# Patient Record
Sex: FEMALE | Race: WHITE | NOT HISPANIC OR LATINO | Employment: PART TIME | URBAN - METROPOLITAN AREA
[De-identification: names, ages, dates, MRNs, and addresses within clinical notes are randomized per-mention and may not be internally consistent; named-entity substitution may affect disease eponyms.]

---

## 2023-12-26 PROBLEM — L70.9 ACNE: Status: ACTIVE | Noted: 2023-12-26

## 2023-12-26 PROBLEM — J30.2 SEASONAL ALLERGIES: Status: ACTIVE | Noted: 2023-12-26

## 2023-12-26 NOTE — PROGRESS NOTES
Subjective   Patient ID: Elif Morgan is a 23 y.o. female who presents for Establish Care, Annual Exam, and Mass.    HPI    Patient presents today to establish care.  Last PCP was: Kristi Hook in [facility]  Last saw PCP:11/8/22  Chose to come here because she was a previous patient    Patient presents today for annual exam.  Denies SOB or chest pain.  Eats healthy diet.  Exercises 2-3x per week.  Sleeping well..  Denies abdominal pain, black or bloody stools.  BM/Urination normal.  Last eye exam was 1 year ago  Last dental exam was 10/2023  No new family h/o cancers or heart disease   Is not fasting for BW.    Pt has a lump on right side of her neck  Pt notice it 2 weeks ago    A friend recently has had mono also and she has been more tired  No abdominal pains no change in her bowels    No fevers or chills at night no other concerning symptoms of backaches    Did have sinus polyps in the past had surgery on those    No known family has had any type of cancers leukemias    Her white count was found to be elevated last year but it was during an illness,, para her was never rechecked    Pt is having cold symptom  Ongoing since 12/24/23  Other symptoms include Nasal congestion, headaches, sinus pressures   Coughing, fatigue  No fever or chill   Pt states not testing for Covid  Pt is taking Cough & cold medicine , Day Quill with relief    No other concern    Review of systems  ; Patient seen today for exam denies any problems with headaches or vision, denies any shortness of breath chest pain nausea or vomiting, no black stool no blood in the stool no heartburn type symptoms denies any problems with constipation or diarrhea, and no dysuria-type symptoms    The patient's allergies medications were reviewed with them today    The patient's social family and surgical history or also reviewed here today, along with her past medical history.     Objective     Alert and active in  no acute distress  HEENT TMs clear oropharynx  "negative nares clear no drainage noted neck supple  With positive right mandibular lymph node tenderness small area of posterior lymph node on the right also tender small cyst left suboccipital muscles no other adenopathy anteriorly or posteriorly or in her groin   Heart regular rate and rhythm without murmur and no carotid bruits  Lungs- clear to auscultation bilaterally, no wheeze or rhonchi noted  Thyroid -negative masses or nodularity  Abdomen- soft times four quadrants , bowel sounds positive no masses or organomegaly, negative tenderness guarding or rebound  Neurological exam unremarkable- DTRs in upper and lower extremities within normal limits.   skin -no lesions noted      /80 (BP Location: Left arm, Patient Position: Sitting, BP Cuff Size: Adult)   Pulse 85   Temp 36.4 °C (97.6 °F) (Temporal)   Resp 16   Ht 1.575 m (5' 2\")   Wt 68.3 kg (150 lb 9.6 oz)   SpO2 100%   BMI 27.55 kg/m²     No Known Allergies    Assessment/Plan   Problem List Items Addressed This Visit       Routine general medical examination at a health care facility - Primary    Relevant Orders    Lipid Panel    CBC and Auto Differential    Comprehensive Metabolic Panel    BMI 27.0-27.9,adult     Other Visit Diagnoses       Acute recurrent frontal sinusitis        Relevant Medications    cefdinir (Omnicef) 300 mg capsule    Malaise and fatigue        Relevant Orders    Tsh With Reflex To Free T4 If Abnormal    Mariaa-Barr virus VCA antibody panel    Lymphadenopathy            She will go ahead and start some antibiotics for her sinuses and this chronic polyposis she has had,, I do not want to get blood work for at least 2 weeks until she is well    Then we will recheck her blood counts to try get this records from last year also will check a mono screen to see if she has been exposed    Understands importance of doing so we did discuss low likelihood of any type of leukemia but things need to be checked  She is agreeable if her " parents have any questions she can be permission to speak with      If anything worsens or changes please call us at once, follow up in the office as planned,

## 2023-12-28 ENCOUNTER — OFFICE VISIT (OUTPATIENT)
Dept: PRIMARY CARE | Facility: CLINIC | Age: 23
End: 2023-12-28
Payer: COMMERCIAL

## 2023-12-28 VITALS
HEART RATE: 85 BPM | SYSTOLIC BLOOD PRESSURE: 104 MMHG | DIASTOLIC BLOOD PRESSURE: 80 MMHG | WEIGHT: 150.6 LBS | HEIGHT: 62 IN | BODY MASS INDEX: 27.71 KG/M2 | OXYGEN SATURATION: 100 % | RESPIRATION RATE: 16 BRPM | TEMPERATURE: 97.6 F

## 2023-12-28 DIAGNOSIS — J01.11 ACUTE RECURRENT FRONTAL SINUSITIS: ICD-10-CM

## 2023-12-28 DIAGNOSIS — R59.1 LYMPHADENOPATHY: ICD-10-CM

## 2023-12-28 DIAGNOSIS — Z00.00 ROUTINE GENERAL MEDICAL EXAMINATION AT A HEALTH CARE FACILITY: Primary | ICD-10-CM

## 2023-12-28 DIAGNOSIS — R53.81 MALAISE AND FATIGUE: ICD-10-CM

## 2023-12-28 DIAGNOSIS — R53.83 MALAISE AND FATIGUE: ICD-10-CM

## 2023-12-28 PROCEDURE — 99385 PREV VISIT NEW AGE 18-39: CPT | Performed by: FAMILY MEDICINE

## 2023-12-28 PROCEDURE — 1036F TOBACCO NON-USER: CPT | Performed by: FAMILY MEDICINE

## 2023-12-28 PROCEDURE — 3008F BODY MASS INDEX DOCD: CPT | Performed by: FAMILY MEDICINE

## 2023-12-28 RX ORDER — CEFDINIR 300 MG/1
600 CAPSULE ORAL DAILY
Qty: 20 CAPSULE | Refills: 0 | Status: SHIPPED | OUTPATIENT
Start: 2023-12-28 | End: 2024-01-07

## 2023-12-28 ASSESSMENT — PATIENT HEALTH QUESTIONNAIRE - PHQ9
1. LITTLE INTEREST OR PLEASURE IN DOING THINGS: NOT AT ALL
SUM OF ALL RESPONSES TO PHQ9 QUESTIONS 1 AND 2: 0
2. FEELING DOWN, DEPRESSED OR HOPELESS: NOT AT ALL

## 2024-01-12 ENCOUNTER — LAB (OUTPATIENT)
Dept: LAB | Facility: LAB | Age: 24
End: 2024-01-12
Payer: COMMERCIAL

## 2024-01-12 DIAGNOSIS — R53.81 MALAISE AND FATIGUE: ICD-10-CM

## 2024-01-12 DIAGNOSIS — R53.83 MALAISE AND FATIGUE: ICD-10-CM

## 2024-01-12 DIAGNOSIS — Z00.00 ROUTINE GENERAL MEDICAL EXAMINATION AT A HEALTH CARE FACILITY: ICD-10-CM

## 2024-01-12 LAB
ALBUMIN SERPL BCP-MCNC: 4.2 G/DL (ref 3.4–5)
ALP SERPL-CCNC: 63 U/L (ref 33–110)
ALT SERPL W P-5'-P-CCNC: 12 U/L (ref 7–45)
ANION GAP SERPL CALC-SCNC: 11 MMOL/L (ref 10–20)
AST SERPL W P-5'-P-CCNC: 16 U/L (ref 9–39)
BASOPHILS # BLD AUTO: 0.03 X10*3/UL (ref 0–0.1)
BASOPHILS NFR BLD AUTO: 0.4 %
BILIRUB SERPL-MCNC: 0.5 MG/DL (ref 0–1.2)
BUN SERPL-MCNC: 12 MG/DL (ref 6–23)
CALCIUM SERPL-MCNC: 9.5 MG/DL (ref 8.6–10.3)
CHLORIDE SERPL-SCNC: 104 MMOL/L (ref 98–107)
CHOLEST SERPL-MCNC: 164 MG/DL (ref 0–199)
CHOLESTEROL/HDL RATIO: 2.1
CO2 SERPL-SCNC: 28 MMOL/L (ref 21–32)
CREAT SERPL-MCNC: 0.78 MG/DL (ref 0.5–1.05)
EBV EA IGG SER QL: NEGATIVE
EBV NA AB SER QL: NEGATIVE
EBV VCA IGG SER IA-ACNC: NEGATIVE
EBV VCA IGM SER IA-ACNC: NORMAL
EGFRCR SERPLBLD CKD-EPI 2021: >90 ML/MIN/1.73M*2
EOSINOPHIL # BLD AUTO: 0.22 X10*3/UL (ref 0–0.7)
EOSINOPHIL NFR BLD AUTO: 2.6 %
ERYTHROCYTE [DISTWIDTH] IN BLOOD BY AUTOMATED COUNT: 12.2 % (ref 11.5–14.5)
GLUCOSE SERPL-MCNC: 92 MG/DL (ref 74–99)
HCT VFR BLD AUTO: 41 % (ref 36–46)
HDLC SERPL-MCNC: 77.9 MG/DL
HGB BLD-MCNC: 13.4 G/DL (ref 12–16)
IMM GRANULOCYTES # BLD AUTO: 0.03 X10*3/UL (ref 0–0.7)
IMM GRANULOCYTES NFR BLD AUTO: 0.4 % (ref 0–0.9)
LDLC SERPL CALC-MCNC: 71 MG/DL
LYMPHOCYTES # BLD AUTO: 3.56 X10*3/UL (ref 1.2–4.8)
LYMPHOCYTES NFR BLD AUTO: 42.8 %
MCH RBC QN AUTO: 29.4 PG (ref 26–34)
MCHC RBC AUTO-ENTMCNC: 32.7 G/DL (ref 32–36)
MCV RBC AUTO: 90 FL (ref 80–100)
MONOCYTES # BLD AUTO: 0.55 X10*3/UL (ref 0.1–1)
MONOCYTES NFR BLD AUTO: 6.6 %
NEUTROPHILS # BLD AUTO: 3.92 X10*3/UL (ref 1.2–7.7)
NEUTROPHILS NFR BLD AUTO: 47.2 %
NON HDL CHOLESTEROL: 86 MG/DL (ref 0–149)
NRBC BLD-RTO: 0 /100 WBCS (ref 0–0)
PLATELET # BLD AUTO: 327 X10*3/UL (ref 150–450)
POTASSIUM SERPL-SCNC: 4 MMOL/L (ref 3.5–5.3)
PROT SERPL-MCNC: 6.4 G/DL (ref 6.4–8.2)
RBC # BLD AUTO: 4.56 X10*6/UL (ref 4–5.2)
SODIUM SERPL-SCNC: 139 MMOL/L (ref 136–145)
TRIGL SERPL-MCNC: 78 MG/DL (ref 0–149)
TSH SERPL-ACNC: 2.92 MIU/L (ref 0.44–3.98)
VLDL: 16 MG/DL (ref 0–40)
WBC # BLD AUTO: 8.3 X10*3/UL (ref 4.4–11.3)

## 2024-01-12 PROCEDURE — 86663 EPSTEIN-BARR ANTIBODY: CPT

## 2024-01-12 PROCEDURE — 80053 COMPREHEN METABOLIC PANEL: CPT

## 2024-01-12 PROCEDURE — 86665 EPSTEIN-BARR CAPSID VCA: CPT

## 2024-01-12 PROCEDURE — 86664 EPSTEIN-BARR NUCLEAR ANTIGEN: CPT

## 2024-01-12 PROCEDURE — 80061 LIPID PANEL: CPT

## 2024-01-12 PROCEDURE — 84443 ASSAY THYROID STIM HORMONE: CPT

## 2024-01-12 PROCEDURE — 85025 COMPLETE CBC W/AUTO DIFF WBC: CPT

## 2024-01-12 PROCEDURE — 36415 COLL VENOUS BLD VENIPUNCTURE: CPT

## 2024-01-14 LAB — EBV VCA IGM SER-ACNC: <10 U/ML (ref 0–43.9)

## 2024-01-15 ENCOUNTER — TELEPHONE (OUTPATIENT)
Dept: PRIMARY CARE | Facility: CLINIC | Age: 24
End: 2024-01-15
Payer: COMMERCIAL

## 2024-01-15 NOTE — TELEPHONE ENCOUNTER
----- Message from Amari Li DO sent at 1/15/2024  1:28 PM EST -----  All mono and other tests were negative

## 2024-01-15 NOTE — TELEPHONE ENCOUNTER
Pt calling for her lab results. Labs were drawn on 1/12/24.    Please advise    Last office visit:  12/28/23

## 2024-07-17 ENCOUNTER — LAB (OUTPATIENT)
Dept: LAB | Facility: LAB | Age: 24
End: 2024-07-17
Payer: COMMERCIAL

## 2024-07-17 ENCOUNTER — APPOINTMENT (OUTPATIENT)
Dept: PRIMARY CARE | Facility: CLINIC | Age: 24
End: 2024-07-17
Payer: COMMERCIAL

## 2024-07-17 VITALS
HEART RATE: 68 BPM | BODY MASS INDEX: 27.23 KG/M2 | WEIGHT: 148 LBS | SYSTOLIC BLOOD PRESSURE: 104 MMHG | OXYGEN SATURATION: 98 % | DIASTOLIC BLOOD PRESSURE: 74 MMHG | HEIGHT: 62 IN

## 2024-07-17 DIAGNOSIS — Z00.00 ROUTINE GENERAL MEDICAL EXAMINATION AT A HEALTH CARE FACILITY: ICD-10-CM

## 2024-07-17 PROCEDURE — 99395 PREV VISIT EST AGE 18-39: CPT | Performed by: FAMILY MEDICINE

## 2024-07-17 PROCEDURE — 86706 HEP B SURFACE ANTIBODY: CPT

## 2024-07-17 PROCEDURE — 1036F TOBACCO NON-USER: CPT | Performed by: FAMILY MEDICINE

## 2024-07-17 PROCEDURE — 86735 MUMPS ANTIBODY: CPT

## 2024-07-17 PROCEDURE — 3008F BODY MASS INDEX DOCD: CPT | Performed by: FAMILY MEDICINE

## 2024-07-17 PROCEDURE — 86765 RUBEOLA ANTIBODY: CPT

## 2024-07-17 PROCEDURE — 86787 VARICELLA-ZOSTER ANTIBODY: CPT

## 2024-07-17 PROCEDURE — 36415 COLL VENOUS BLD VENIPUNCTURE: CPT

## 2024-07-17 PROCEDURE — 86317 IMMUNOASSAY INFECTIOUS AGENT: CPT

## 2024-07-17 NOTE — PROGRESS NOTES
"Subjective   Patient ID: Elif Morgan is a 24 y.o. female who presents for Blood work.  HPI    Annual physical   Eats a generally healthy diet   Exercises 1-2x  week  Denies any chest pain,SOB  No Abdominal pain   No black or bloody stools   Urination/BM normal   Last eye apt 2 mo ago  Last dental apt 6 mo ago  Last Gyn apt last year  No new family h/o cancers or heart disease    Requesting titers for school - brought form to be filled out  Denies any issues with hearing.   Denies SOB or chest pain with activities.     Last physical exam 12/28/23        Review of systems  ; Patient seen today for exam denies any problems with headaches or vision, denies any shortness of breath chest pain nausea or vomiting, no black stool no blood in the stool no heartburn type symptoms denies any problems with constipation or diarrhea, and no dysuria-type symptoms    The patient's allergies medications were reviewed with them today    The patient's social family and surgical history or also reviewed here today, along with her past medical history.     Objective     Alert and active in  no acute distress  HEENT TMs clear oropharynx negative nares clear no drainage noted neck supple  With no adenopathy   Heart regular rate and rhythm without murmur and no carotid bruits  Lungs- clear to auscultation bilaterally, no wheeze or rhonchi noted  Thyroid -negative masses or nodularity  Abdomen- soft times four quadrants , bowel sounds positive no masses or organomegaly, negative tenderness guarding or rebound  Neurological exam unremarkable- DTRs in upper and lower extremities within normal limits.   skin -no lesions noted      /74   Pulse 68   Ht 1.575 m (5' 2\")   Wt 67.1 kg (148 lb)   LMP 07/04/2024   SpO2 98%   BMI 27.07 kg/m²     No Known Allergies    Assessment/Plan   Problem List Items Addressed This Visit       Routine general medical examination at a health care facility    Relevant Orders    Hepatitis B Surface Antibody "    Mumps Antibody, IgG    Rubella Antibody, IgG    Rubeola Antibody, IgG    Varicella Zoster Antibody, IgG     Forms completed for her today.     Labs have been ordered, she/he will have these performed and we will contact her/him with results.  (MMR, Varicella, Hepatitis B)    If anything worsens or changes please call us at once, follow up in the office as planned.    Scribe Attestation  By signing my name below, I, Leatha Sloan MA, Scribe   attest that this documentation has been prepared under the direction and in the presence of Amari Li DO.

## 2024-07-18 ENCOUNTER — TELEPHONE (OUTPATIENT)
Dept: PRIMARY CARE | Facility: CLINIC | Age: 24
End: 2024-07-18
Payer: COMMERCIAL

## 2024-07-18 LAB
HBV SURFACE AB SER-ACNC: 5.8 MIU/ML
MEV IGG SER QL IA: POSITIVE
MUMPS IGG ANTIBODY INDEX: 4 IA
MUV IGG SER IA-ACNC: POSITIVE
RUBEOLA IGG ANTIBODY INDEX: 2.6 IA
RUBV IGG SERPL IA-ACNC: 4.1 IA
RUBV IGG SERPL QL IA: POSITIVE
VARICELLA ZOSTER IGG INDEX: 0.4 IA
VZV IGG SER QL IA: NEGATIVE

## 2024-07-18 NOTE — TELEPHONE ENCOUNTER
----- Message from Amari Li sent at 7/18/2024 12:14 PM EDT -----  Reviewed her labs she needs a booster of her varicella vaccine,,, and also hepatitis B okay to give together per MA visit the other titers were normal

## 2024-07-19 ENCOUNTER — CLINICAL SUPPORT (OUTPATIENT)
Dept: PRIMARY CARE | Facility: CLINIC | Age: 24
End: 2024-07-19
Payer: COMMERCIAL

## 2024-07-19 DIAGNOSIS — Z23 NEED FOR VARICELLA VACCINE: ICD-10-CM

## 2024-07-19 DIAGNOSIS — Z23 NEED FOR HEPATITIS B BOOSTER VACCINATION: Primary | ICD-10-CM

## 2024-07-19 PROCEDURE — 90716 VAR VACCINE LIVE SUBQ: CPT | Performed by: FAMILY MEDICINE

## 2024-07-19 PROCEDURE — 90743 HEPB VACC 2 DOSE ADOLESC IM: CPT | Performed by: FAMILY MEDICINE

## 2024-07-19 PROCEDURE — 90471 IMMUNIZATION ADMIN: CPT | Performed by: FAMILY MEDICINE

## 2024-07-19 PROCEDURE — 90472 IMMUNIZATION ADMIN EACH ADD: CPT | Performed by: FAMILY MEDICINE
